# Patient Record
Sex: MALE | Race: WHITE | HISPANIC OR LATINO | Employment: FULL TIME | URBAN - METROPOLITAN AREA
[De-identification: names, ages, dates, MRNs, and addresses within clinical notes are randomized per-mention and may not be internally consistent; named-entity substitution may affect disease eponyms.]

---

## 2023-02-03 ENCOUNTER — HOSPITAL ENCOUNTER (EMERGENCY)
Facility: MEDICAL CENTER | Age: 33
End: 2023-02-03
Attending: EMERGENCY MEDICINE
Payer: COMMERCIAL

## 2023-02-03 ENCOUNTER — APPOINTMENT (OUTPATIENT)
Dept: RADIOLOGY | Facility: MEDICAL CENTER | Age: 33
End: 2023-02-03
Attending: EMERGENCY MEDICINE
Payer: COMMERCIAL

## 2023-02-03 VITALS
TEMPERATURE: 97 F | RESPIRATION RATE: 20 BRPM | WEIGHT: 290.13 LBS | SYSTOLIC BLOOD PRESSURE: 132 MMHG | HEART RATE: 86 BPM | BODY MASS INDEX: 41.54 KG/M2 | DIASTOLIC BLOOD PRESSURE: 67 MMHG | HEIGHT: 70 IN | OXYGEN SATURATION: 94 %

## 2023-02-03 DIAGNOSIS — R07.9 CHEST PAIN, UNSPECIFIED TYPE: ICD-10-CM

## 2023-02-03 LAB
ALBUMIN SERPL BCP-MCNC: 4.8 G/DL (ref 3.2–4.9)
ALBUMIN/GLOB SERPL: 1.3 G/DL
ALP SERPL-CCNC: 96 U/L (ref 30–99)
ALT SERPL-CCNC: 32 U/L (ref 2–50)
ANION GAP SERPL CALC-SCNC: 12 MMOL/L (ref 7–16)
AST SERPL-CCNC: 20 U/L (ref 12–45)
BASOPHILS # BLD AUTO: 0.4 % (ref 0–1.8)
BASOPHILS # BLD: 0.05 K/UL (ref 0–0.12)
BILIRUB SERPL-MCNC: 0.3 MG/DL (ref 0.1–1.5)
BUN SERPL-MCNC: 11 MG/DL (ref 8–22)
CALCIUM ALBUM COR SERPL-MCNC: 8.9 MG/DL (ref 8.5–10.5)
CALCIUM SERPL-MCNC: 9.5 MG/DL (ref 8.5–10.5)
CHLORIDE SERPL-SCNC: 102 MMOL/L (ref 96–112)
CO2 SERPL-SCNC: 24 MMOL/L (ref 20–33)
CREAT SERPL-MCNC: 0.8 MG/DL (ref 0.5–1.4)
D DIMER PPP IA.FEU-MCNC: <0.27 UG/ML (FEU) (ref 0–0.5)
EKG IMPRESSION: NORMAL
EOSINOPHIL # BLD AUTO: 0.24 K/UL (ref 0–0.51)
EOSINOPHIL NFR BLD: 2 % (ref 0–6.9)
ERYTHROCYTE [DISTWIDTH] IN BLOOD BY AUTOMATED COUNT: 39.8 FL (ref 35.9–50)
FLUAV RNA SPEC QL NAA+PROBE: NEGATIVE
FLUBV RNA SPEC QL NAA+PROBE: NEGATIVE
GFR SERPLBLD CREATININE-BSD FMLA CKD-EPI: 121 ML/MIN/1.73 M 2
GLOBULIN SER CALC-MCNC: 3.6 G/DL (ref 1.9–3.5)
GLUCOSE SERPL-MCNC: 149 MG/DL (ref 65–99)
HCT VFR BLD AUTO: 45.1 % (ref 42–52)
HGB BLD-MCNC: 15.5 G/DL (ref 14–18)
IMM GRANULOCYTES # BLD AUTO: 0.07 K/UL (ref 0–0.11)
IMM GRANULOCYTES NFR BLD AUTO: 0.6 % (ref 0–0.9)
LYMPHOCYTES # BLD AUTO: 2.36 K/UL (ref 1–4.8)
LYMPHOCYTES NFR BLD: 20.1 % (ref 22–41)
MCH RBC QN AUTO: 28.9 PG (ref 27–33)
MCHC RBC AUTO-ENTMCNC: 34.4 G/DL (ref 33.7–35.3)
MCV RBC AUTO: 84.1 FL (ref 81.4–97.8)
MONOCYTES # BLD AUTO: 0.77 K/UL (ref 0–0.85)
MONOCYTES NFR BLD AUTO: 6.6 % (ref 0–13.4)
NEUTROPHILS # BLD AUTO: 8.26 K/UL (ref 1.82–7.42)
NEUTROPHILS NFR BLD: 70.3 % (ref 44–72)
NRBC # BLD AUTO: 0 K/UL
NRBC BLD-RTO: 0 /100 WBC
PLATELET # BLD AUTO: 275 K/UL (ref 164–446)
PMV BLD AUTO: 10.5 FL (ref 9–12.9)
POTASSIUM SERPL-SCNC: 3.7 MMOL/L (ref 3.6–5.5)
PROT SERPL-MCNC: 8.4 G/DL (ref 6–8.2)
RBC # BLD AUTO: 5.36 M/UL (ref 4.7–6.1)
RSV RNA SPEC QL NAA+PROBE: NEGATIVE
SARS-COV-2 RNA RESP QL NAA+PROBE: NOTDETECTED
SODIUM SERPL-SCNC: 138 MMOL/L (ref 135–145)
SPECIMEN SOURCE: NORMAL
TROPONIN T SERPL-MCNC: <6 NG/L (ref 6–19)
TROPONIN T SERPL-MCNC: <6 NG/L (ref 6–19)
WBC # BLD AUTO: 11.8 K/UL (ref 4.8–10.8)

## 2023-02-03 PROCEDURE — 99284 EMERGENCY DEPT VISIT MOD MDM: CPT

## 2023-02-03 PROCEDURE — 93005 ELECTROCARDIOGRAM TRACING: CPT | Performed by: EMERGENCY MEDICINE

## 2023-02-03 PROCEDURE — 84484 ASSAY OF TROPONIN QUANT: CPT

## 2023-02-03 PROCEDURE — 80053 COMPREHEN METABOLIC PANEL: CPT

## 2023-02-03 PROCEDURE — C9803 HOPD COVID-19 SPEC COLLECT: HCPCS | Performed by: EMERGENCY MEDICINE

## 2023-02-03 PROCEDURE — 0241U HCHG SARS-COV-2 COVID-19 NFCT DS RESP RNA 4 TRGT MIC: CPT

## 2023-02-03 PROCEDURE — 93005 ELECTROCARDIOGRAM TRACING: CPT

## 2023-02-03 PROCEDURE — 36415 COLL VENOUS BLD VENIPUNCTURE: CPT

## 2023-02-03 PROCEDURE — 85025 COMPLETE CBC W/AUTO DIFF WBC: CPT

## 2023-02-03 PROCEDURE — 71045 X-RAY EXAM CHEST 1 VIEW: CPT

## 2023-02-03 PROCEDURE — 85379 FIBRIN DEGRADATION QUANT: CPT

## 2023-02-03 PROCEDURE — C9803 HOPD COVID-19 SPEC COLLECT: HCPCS

## 2023-02-04 NOTE — ED PROVIDER NOTES
"ER Provider Note    Scribed for Tin Zapata M.d. by Marin Wang. 2/3/2023  8:14 PM    Primary Care Provider: Patient reports no primary care provider.     CHIEF COMPLAINT  Chief Complaint   Patient presents with    Shortness of Breath     X 3 hours, c/o chest tightness & shortness of breath. Had dinner at a conference, went back to hotel room & started having worsening shortness of breath on exertion & chest tightness, L sided. X 1 day of flu like symptoms, cough etc. C/o tingling to bilateral arms, Speaking full sentences in triage.     Chest Pain     HPI/ROS  LIMITATION TO HISTORY   None  OUTSIDE HISTORIAN(S):  Friend endorses history    Kvng Gutiérrez is a 32 y.o. male who presents to the ED complaining of left-sided chest pain onset 3 hours ago. He describes the pain as a tightness which resulted in his associated shortness of breath. He reports that he was at dinner for a conference and when the pain started and he returned to his hotel room the pain worsened and the shortness of breath began. He reports that he has had episodes of similar symptoms. He is originally from [place]. He reports additionally, that he was had cough and bilateral arm numbness for about a day.    See HPI for details. All other systems negative.     PAST MEDICAL HISTORY  No pertinent medical history noted.    SURGICAL HISTORY  No pertinent past surgical history noted.     FAMILY HISTORY  No pertinent family history noted.     SOCIAL HISTORY  No pertinent social history noted    ALLERGIES  Penicillins    PHYSICAL EXAM  BP (!) 142/83   Pulse 92   Temp 36.2 °C (97.2 °F) (Temporal)   Resp 20   Ht 1.778 m (5' 10\")   Wt (!) 132 kg (290 lb 2 oz)   SpO2 97%      Constitutional: Alert in no apparent distress.  HENT: No signs of trauma, Bilateral external ears normal, Nose normal.   Eyes: Pupils are equal and reactive, Conjunctiva normal, Non-icteric.   Neck: Normal range of motion, No tenderness, Supple, No stridor. "   Lymphatic: No lymphadenopathy noted.   Cardiovascular: Regular rate and rhythm, no murmurs.   Thorax & Lungs: Normal breath sounds, No respiratory distress, No wheezing, No chest tenderness.   Abdomen: Bowel sounds normal, Soft, No tenderness, No masses, No pulsatile masses. No peritoneal signs.  Skin: Warm, Dry, No erythema, No rash.   Back: No bony tenderness, No CVA tenderness.   Extremities: Intact distal pulses, No edema, No tenderness, No cyanosis.  Musculoskeletal: Good range of motion in all major joints. No tenderness to palpation or major deformities noted.   Neurologic: Alert , Normal motor function, Normal sensory function, No focal deficits noted.   Psychiatric: Affect normal, Judgment normal, Mood normal.     DIAGNOSTIC STUDIES    Labs:   All labs reviewed by me.  Labs Reviewed   CBC WITH DIFFERENTIAL - Abnormal; Notable for the following components:       Result Value    WBC 11.8 (*)     Lymphocytes 20.10 (*)     Neutrophils (Absolute) 8.26 (*)     All other components within normal limits   COMP METABOLIC PANEL - Abnormal; Notable for the following components:    Glucose 149 (*)     Total Protein 8.4 (*)     Globulin 3.6 (*)     All other components within normal limits    Narrative:     Biotin intake of greater than 5 mg per day may interfere with  troponin levels, causing false low values.   COV-2, FLU A/B, AND RSV BY PCR ([employer])   TROPONIN    Narrative:     Biotin intake of greater than 5 mg per day may interfere with  troponin levels, causing false low values.   CORRECTED CALCIUM    Narrative:     Biotin intake of greater than 5 mg per day may interfere with  troponin levels, causing false low values.   ESTIMATED GFR    Narrative:     Biotin intake of greater than 5 mg per day may interfere with  troponin levels, causing false low values.   D-DIMER    Narrative:     Biotin intake of greater than 5 mg per day may interfere with  troponin levels, causing false low values.   TROPONIN     Narrative:     Biotin intake of greater than 5 mg per day may interfere with  troponin levels, causing false low values.      EK Lead EKG interpreted by me to show:  Sinus tachycardia  Rate 101  Axis: Normal  Intervals: Normal  Normal T waves  Normal ST segments  My impression of this EKG: No significant ST elevation, depression, or T-wave inversion. Does not indicate ischemia or arrhythmia at this time.     Radiology:   The attending emergency physician has independently interpreted the diagnostic imaging associated with this visit and am waiting the final reading from the radiologist.   Preliminary interpretation is a follows: No pneumonia or pneumothorax.  Radiologist interpretation:   DX-CHEST-PORTABLE (1 VIEW)   Final Result      No radiographic evidence of acute cardiopulmonary process.          COURSE & MEDICAL DECISION MAKING     ED Observation Status? Yes; I am placing the patient in to an observation status due to a diagnostic uncertainty as well as therapeutic intensity. Patient placed in observation status at 8:14 PM, 2/3/2023.     Observation plan is as follows: Labs and cardiac monitoring.    Upon Reevaluation, the patient's condition has: Improved; and will be discharged.    Patient discharged from ED Observation status at 10:10 PM 2/3/2023.      INITIAL ASSESSMENT AND PLAN    8:14 PM - Patient was seen and evaluated at bedside. Ordered labs: EKG, Complete Metabolic Panel, CBC with differentials, Troponin, and CoV-2,Flu A/B and RSV by PCR and imaging: DX-Chest to evaluate their symptoms. Patient verbalizes understanding and agreement to this plan of care.      9:21 PM  Ordered D-Dimer and Troponin.     Care Narrative: Patient has chest pain.  The patient is a low heart score given the fact the troponin is negative.  The patient is negative for COVID.  Chest x-ray shows no acute disease.  D-dimer is negative.  At this time given the whole set of symptoms and multiple troponins as well as labs  the patient is safe for discharge home.  He will be given strict return precautions and follow-up.    ADDITIONAL PROBLEM LIST AND DISPOSITION      Escalation of care considered, and ultimately not performed: the patient was evaluated by me, after discussion I have recommended the patient to be discharged.    Barriers to care at this time, including but not limited to:  None .     Decision tools and prescription drugs considered including, but not limited to: HEART Score low .    Patient will be discharged home.    FOLLOW UP:  [facility]  [address] [facility] [address]  [phone]        FINAL DIAGNOSIS  1. Chest pain, unspecified type      The note accurately reflects work and decisions made by me.  Tin Zapata M.D.  2/4/2023  3:49 AM     Marin STEPHENS ([place]), am scribing for, and in the presence of, Tin Zapata M.D..    Electronically signed by: Marin Wang (Hayden), 2/3/2023    Tin STEPHENS M.D. personally performed the services described in this documentation, as scribed by Marin Wang in my presence, and it is both accurate and complete.

## 2023-02-04 NOTE — ED NOTES
Pt ambulated with normal steady gait from ER lobby to pt room. Pt asked to dress in gown. BP cuff and pulse ox attached to cardiac monitor. Family at bedside. Chart up for ERP.

## 2023-02-04 NOTE — ED TRIAGE NOTES
Kvng Gutiérrez  32 y.o.  male  Chief Complaint   Patient presents with    Shortness of Breath     X 3 hours, c/o chest tightness & shortness of breath. Had dinner at a conference, went back to hotel room & started having worsening shortness of breath on exertion & chest tightness, L sided. X 1 day of flu like symptoms, cough etc. C/o tingling to bilateral arms, Speaking full sentences in triage.     Chest Pain       Pt visiting from [place]. Denies medical history other than PVCs. Labs & EKG ordered. Patient educated on triage process, to alert staff if any changes in condition.

## 2023-02-04 NOTE — ED NOTES
Pt rounded on. Pt requested something to drink. Pt educated that they are NPO until seen and cleared by ERP. Pt verbalized understanding. Pt denies any other needs at this time. ABCs intact.

## 2023-02-04 NOTE — ED NOTES
Discharge instructions provided to pt. ERP went over discharge instructions with pt. Pt instructed to return to emergency department for new or worsening symptoms. Pt verbalized understanding of discharge instructions.